# Patient Record
Sex: FEMALE | Race: OTHER
[De-identification: names, ages, dates, MRNs, and addresses within clinical notes are randomized per-mention and may not be internally consistent; named-entity substitution may affect disease eponyms.]

---

## 2021-07-30 ENCOUNTER — HOSPITAL ENCOUNTER (INPATIENT)
Dept: HOSPITAL 15 - ER | Age: 56
LOS: 1 days | Discharge: HOME | DRG: 52 | End: 2021-07-31
Attending: INTERNAL MEDICINE | Admitting: INTERNAL MEDICINE
Payer: MEDICAID

## 2021-07-30 VITALS — HEIGHT: 61 IN | BODY MASS INDEX: 33.92 KG/M2 | WEIGHT: 179.68 LBS

## 2021-07-30 VITALS — DIASTOLIC BLOOD PRESSURE: 75 MMHG | SYSTOLIC BLOOD PRESSURE: 144 MMHG

## 2021-07-30 VITALS — DIASTOLIC BLOOD PRESSURE: 70 MMHG | SYSTOLIC BLOOD PRESSURE: 126 MMHG

## 2021-07-30 DIAGNOSIS — Z91.19: ICD-10-CM

## 2021-07-30 DIAGNOSIS — E66.3: ICD-10-CM

## 2021-07-30 DIAGNOSIS — G43.909: ICD-10-CM

## 2021-07-30 DIAGNOSIS — F32.9: ICD-10-CM

## 2021-07-30 DIAGNOSIS — Z86.73: ICD-10-CM

## 2021-07-30 DIAGNOSIS — E78.5: ICD-10-CM

## 2021-07-30 DIAGNOSIS — Z79.899: ICD-10-CM

## 2021-07-30 DIAGNOSIS — Z20.822: ICD-10-CM

## 2021-07-30 DIAGNOSIS — I16.1: ICD-10-CM

## 2021-07-30 DIAGNOSIS — N39.0: ICD-10-CM

## 2021-07-30 DIAGNOSIS — G93.41: Primary | ICD-10-CM

## 2021-07-30 DIAGNOSIS — Z79.4: ICD-10-CM

## 2021-07-30 DIAGNOSIS — E87.1: ICD-10-CM

## 2021-07-30 DIAGNOSIS — E11.9: ICD-10-CM

## 2021-07-30 LAB
ALBUMIN SERPL-MCNC: 3.8 G/DL (ref 3.4–5)
ALCOHOL, URINE: < 3 MG/DL (ref 0–10)
ALP SERPL-CCNC: 135 U/L (ref 45–117)
ALT SERPL-CCNC: 35 U/L (ref 13–56)
AMPHETAMINES UR QL SCN: NEGATIVE
ANION GAP SERPL CALCULATED.3IONS-SCNC: 5 MMOL/L (ref 5–15)
BARBITURATES UR QL SCN: NEGATIVE
BENZODIAZ UR QL SCN: NEGATIVE
BILIRUB SERPL-MCNC: 0.3 MG/DL (ref 0.2–1)
BUN SERPL-MCNC: 14 MG/DL (ref 7–18)
BUN/CREAT SERPL: 18.4
BZE UR QL SCN: NEGATIVE
CALCIUM SERPL-MCNC: 9 MG/DL (ref 8.5–10.1)
CANNABINOIDS UR QL SCN: NEGATIVE
CHLORIDE SERPL-SCNC: 104 MMOL/L (ref 98–107)
CO2 SERPL-SCNC: 26 MMOL/L (ref 21–32)
GLUCOSE SERPL-MCNC: 129 MG/DL (ref 74–106)
HCT VFR BLD AUTO: 37.4 % (ref 36–46)
HGB BLD-MCNC: 12.8 G/DL (ref 12.2–16.2)
MCH RBC QN AUTO: 29.9 PG (ref 28–32)
MCV RBC AUTO: 87.4 FL (ref 80–100)
NRBC BLD QL AUTO: 0.1 %
OPIATES UR QL SCN: NEGATIVE
PCP UR QL SCN: NEGATIVE
POTASSIUM SERPL-SCNC: 4.1 MMOL/L (ref 3.5–5.1)
PROT SERPL-MCNC: 7.9 G/DL (ref 6.4–8.2)
SODIUM SERPL-SCNC: 135 MMOL/L (ref 136–145)

## 2021-07-30 PROCEDURE — 80307 DRUG TEST PRSMV CHEM ANLYZR: CPT

## 2021-07-30 PROCEDURE — 93005 ELECTROCARDIOGRAM TRACING: CPT

## 2021-07-30 PROCEDURE — 36415 COLL VENOUS BLD VENIPUNCTURE: CPT

## 2021-07-30 PROCEDURE — 70450 CT HEAD/BRAIN W/O DYE: CPT

## 2021-07-30 PROCEDURE — 81001 URINALYSIS AUTO W/SCOPE: CPT

## 2021-07-30 PROCEDURE — 84484 ASSAY OF TROPONIN QUANT: CPT

## 2021-07-30 PROCEDURE — 96365 THER/PROPH/DIAG IV INF INIT: CPT

## 2021-07-30 PROCEDURE — 80053 COMPREHEN METABOLIC PANEL: CPT

## 2021-07-30 PROCEDURE — 87086 URINE CULTURE/COLONY COUNT: CPT

## 2021-07-30 PROCEDURE — 85025 COMPLETE CBC W/AUTO DIFF WBC: CPT

## 2021-07-30 PROCEDURE — 83036 HEMOGLOBIN GLYCOSYLATED A1C: CPT

## 2021-07-30 PROCEDURE — 82962 GLUCOSE BLOOD TEST: CPT

## 2021-07-30 PROCEDURE — 87040 BLOOD CULTURE FOR BACTERIA: CPT

## 2021-07-30 PROCEDURE — 93306 TTE W/DOPPLER COMPLETE: CPT

## 2021-07-30 PROCEDURE — 87426 SARSCOV CORONAVIRUS AG IA: CPT

## 2021-07-30 RX ADMIN — HUMAN INSULIN SCH UNITS: 100 INJECTION, SOLUTION SUBCUTANEOUS at 17:09

## 2021-07-30 RX ADMIN — HYDROCODONE BITARTRATE AND ACETAMINOPHEN PRN TAB: 5; 325 TABLET ORAL at 20:03

## 2021-07-30 RX ADMIN — Medication SCH STRIP: at 17:09

## 2021-07-30 RX ADMIN — SODIUM CHLORIDE SCH MLS/HR: 0.9 INJECTION, SOLUTION INTRAVENOUS at 15:53

## 2021-07-30 RX ADMIN — Medication SCH STRIP: at 21:59

## 2021-07-31 VITALS — DIASTOLIC BLOOD PRESSURE: 67 MMHG | SYSTOLIC BLOOD PRESSURE: 106 MMHG

## 2021-07-31 VITALS — DIASTOLIC BLOOD PRESSURE: 72 MMHG | SYSTOLIC BLOOD PRESSURE: 131 MMHG

## 2021-07-31 RX ADMIN — SODIUM CHLORIDE SCH MLS/HR: 0.9 INJECTION, SOLUTION INTRAVENOUS at 07:25

## 2021-07-31 RX ADMIN — HUMAN INSULIN SCH UNITS: 100 INJECTION, SOLUTION SUBCUTANEOUS at 11:39

## 2021-07-31 RX ADMIN — HYDROCODONE BITARTRATE AND ACETAMINOPHEN PRN TAB: 5; 325 TABLET ORAL at 06:26

## 2021-07-31 RX ADMIN — Medication SCH STRIP: at 06:26

## 2021-07-31 RX ADMIN — HUMAN INSULIN SCH UNITS: 100 INJECTION, SOLUTION SUBCUTANEOUS at 06:28

## 2021-07-31 RX ADMIN — Medication SCH STRIP: at 11:38

## 2023-06-23 ENCOUNTER — HOSPITAL ENCOUNTER (EMERGENCY)
Dept: HOSPITAL 15 - ER | Age: 58
Discharge: HOME | End: 2023-06-23
Payer: MEDICAID

## 2023-06-23 VITALS — BODY MASS INDEX: 29.18 KG/M2 | WEIGHT: 154.54 LBS | HEIGHT: 61 IN

## 2023-06-23 VITALS — SYSTOLIC BLOOD PRESSURE: 143 MMHG | DIASTOLIC BLOOD PRESSURE: 98 MMHG

## 2023-06-23 DIAGNOSIS — I10: Primary | ICD-10-CM

## 2023-06-23 DIAGNOSIS — F41.9: ICD-10-CM

## 2023-06-23 DIAGNOSIS — E11.9: ICD-10-CM

## 2023-06-23 LAB
ALBUMIN SERPL-MCNC: 4 G/DL (ref 3.4–5)
ALP SERPL-CCNC: 123 U/L (ref 45–117)
ALT SERPL-CCNC: 44 U/L (ref 13–56)
ANION GAP SERPL CALCULATED.3IONS-SCNC: 7 MMOL/L (ref 5–15)
BILIRUB SERPL-MCNC: 0.2 MG/DL (ref 0.2–1)
BUN SERPL-MCNC: 12 MG/DL (ref 7–18)
BUN/CREAT SERPL: 15.8 (ref 10–20)
CALCIUM SERPL-MCNC: 9.5 MG/DL (ref 8.5–10.1)
CHLORIDE SERPL-SCNC: 104 MMOL/L (ref 98–107)
CO2 SERPL-SCNC: 27 MMOL/L (ref 21–32)
GLUCOSE SERPL-MCNC: 93 MG/DL (ref 74–106)
HCT VFR BLD AUTO: 39.5 % (ref 36–46)
HGB BLD-MCNC: 13.1 G/DL (ref 12.2–16.2)
MCH RBC QN AUTO: 29.2 PG (ref 28–32)
MCV RBC AUTO: 88 FL (ref 80–100)
NRBC BLD QL AUTO: 0.2 %
POTASSIUM SERPL-SCNC: 4.4 MMOL/L (ref 3.5–5.1)
PROT SERPL-MCNC: 7.6 G/DL (ref 6.4–8.2)
SODIUM SERPL-SCNC: 138 MMOL/L (ref 136–145)

## 2023-06-23 PROCEDURE — 71045 X-RAY EXAM CHEST 1 VIEW: CPT

## 2023-06-23 PROCEDURE — 36415 COLL VENOUS BLD VENIPUNCTURE: CPT

## 2023-06-23 PROCEDURE — 84484 ASSAY OF TROPONIN QUANT: CPT

## 2023-06-23 PROCEDURE — 85025 COMPLETE CBC W/AUTO DIFF WBC: CPT

## 2023-06-23 PROCEDURE — 80053 COMPREHEN METABOLIC PANEL: CPT

## 2023-06-23 PROCEDURE — 99285 EMERGENCY DEPT VISIT HI MDM: CPT

## 2023-06-23 PROCEDURE — 96372 THER/PROPH/DIAG INJ SC/IM: CPT

## 2023-06-23 PROCEDURE — 93005 ELECTROCARDIOGRAM TRACING: CPT

## 2023-08-20 ENCOUNTER — HOSPITAL ENCOUNTER (EMERGENCY)
Dept: HOSPITAL 15 - ER | Age: 58
Discharge: HOME | End: 2023-08-20
Payer: MEDICAID

## 2023-08-20 VITALS — HEART RATE: 70 BPM | RESPIRATION RATE: 18 BRPM | OXYGEN SATURATION: 98 %

## 2023-08-20 VITALS — BODY MASS INDEX: 30.34 KG/M2 | HEIGHT: 60 IN | WEIGHT: 154.54 LBS

## 2023-08-20 VITALS — TEMPERATURE: 97.5 F | SYSTOLIC BLOOD PRESSURE: 109 MMHG | DIASTOLIC BLOOD PRESSURE: 65 MMHG

## 2023-08-20 DIAGNOSIS — N39.0: ICD-10-CM

## 2023-08-20 DIAGNOSIS — R51.9: ICD-10-CM

## 2023-08-20 DIAGNOSIS — E78.5: ICD-10-CM

## 2023-08-20 DIAGNOSIS — I10: ICD-10-CM

## 2023-08-20 DIAGNOSIS — Z32.02: ICD-10-CM

## 2023-08-20 DIAGNOSIS — E10.9: ICD-10-CM

## 2023-08-20 DIAGNOSIS — I16.0: Primary | ICD-10-CM

## 2023-08-20 LAB
ALBUMIN SERPL-MCNC: 4.1 G/DL (ref 3.4–5)
ALP SERPL-CCNC: 128 U/L (ref 45–117)
ALT SERPL-CCNC: 28 U/L (ref 13–56)
ANION GAP SERPL CALCULATED.3IONS-SCNC: 9 MMOL/L (ref 5–15)
BILIRUB SERPL-MCNC: 0.2 MG/DL (ref 0.2–1)
BUN SERPL-MCNC: 16 MG/DL (ref 7–18)
BUN/CREAT SERPL: 19.3 (ref 10–20)
CALCIUM SERPL-MCNC: 9.1 MG/DL (ref 8.5–10.1)
CHLORIDE SERPL-SCNC: 104 MMOL/L (ref 98–107)
CO2 SERPL-SCNC: 24 MMOL/L (ref 21–32)
GLUCOSE SERPL-MCNC: 178 MG/DL (ref 74–106)
HCT VFR BLD AUTO: 37.9 % (ref 36–46)
HGB BLD-MCNC: 12.9 G/DL (ref 12.2–16.2)
MCH RBC QN AUTO: 29.3 PG (ref 28–32)
MCV RBC AUTO: 86.4 FL (ref 80–100)
NRBC BLD QL AUTO: 0.1 %
POTASSIUM SERPL-SCNC: 4 MMOL/L (ref 3.5–5.1)
PROT SERPL-MCNC: 8.1 G/DL (ref 6.4–8.2)
PROT UR STRIP.AUTO-MCNC: (no result) MG/DL
SODIUM SERPL-SCNC: 137 MMOL/L (ref 136–145)

## 2023-08-20 PROCEDURE — 80053 COMPREHEN METABOLIC PANEL: CPT

## 2023-08-20 PROCEDURE — 36415 COLL VENOUS BLD VENIPUNCTURE: CPT

## 2023-08-20 PROCEDURE — 93005 ELECTROCARDIOGRAM TRACING: CPT

## 2023-08-20 PROCEDURE — 84484 ASSAY OF TROPONIN QUANT: CPT

## 2023-08-20 PROCEDURE — 85379 FIBRIN DEGRADATION QUANT: CPT

## 2023-08-20 PROCEDURE — 99285 EMERGENCY DEPT VISIT HI MDM: CPT

## 2023-08-20 PROCEDURE — 83880 ASSAY OF NATRIURETIC PEPTIDE: CPT

## 2023-08-20 PROCEDURE — 96365 THER/PROPH/DIAG IV INF INIT: CPT

## 2023-08-20 PROCEDURE — 81001 URINALYSIS AUTO W/SCOPE: CPT

## 2023-08-20 PROCEDURE — 85025 COMPLETE CBC W/AUTO DIFF WBC: CPT

## 2023-08-20 PROCEDURE — 71045 X-RAY EXAM CHEST 1 VIEW: CPT

## 2025-03-04 ENCOUNTER — HOSPITAL ENCOUNTER (INPATIENT)
Dept: HOSPITAL 15 - ER | Age: 60
LOS: 1 days | Discharge: HOME | DRG: 199 | End: 2025-03-05
Payer: MEDICAID

## 2025-03-04 VITALS — OXYGEN SATURATION: 95 % | RESPIRATION RATE: 18 BRPM | HEART RATE: 65 BPM

## 2025-03-04 VITALS — HEIGHT: 61 IN | WEIGHT: 150.36 LBS | BODY MASS INDEX: 28.39 KG/M2

## 2025-03-04 DIAGNOSIS — K21.9: ICD-10-CM

## 2025-03-04 DIAGNOSIS — I10: ICD-10-CM

## 2025-03-04 DIAGNOSIS — N20.0: ICD-10-CM

## 2025-03-04 DIAGNOSIS — Z86.73: ICD-10-CM

## 2025-03-04 DIAGNOSIS — I16.0: Primary | ICD-10-CM

## 2025-03-04 DIAGNOSIS — Z79.4: ICD-10-CM

## 2025-03-04 DIAGNOSIS — Z79.899: ICD-10-CM

## 2025-03-04 DIAGNOSIS — Z79.84: ICD-10-CM

## 2025-03-04 DIAGNOSIS — I16.1: ICD-10-CM

## 2025-03-04 DIAGNOSIS — E11.9: ICD-10-CM

## 2025-03-04 DIAGNOSIS — E78.5: ICD-10-CM

## 2025-03-04 DIAGNOSIS — Z98.891: ICD-10-CM

## 2025-03-04 DIAGNOSIS — Z79.82: ICD-10-CM

## 2025-03-04 DIAGNOSIS — E66.9: ICD-10-CM

## 2025-03-04 DIAGNOSIS — Z82.3: ICD-10-CM

## 2025-03-04 LAB
ANION GAP SERPL CALCULATED.3IONS-SCNC: 10 MMOL/L (ref 5–15)
BUN SERPL-MCNC: 11 MG/DL (ref 9–23)
BUN/CREAT SERPL: 13.4 (ref 10–20)
CALCIUM SERPL-MCNC: 10.7 MG/DL (ref 8.7–10.4)
CHLORIDE SERPL-SCNC: 101 MMOL/L (ref 98–107)
CHOLEST SERPL-MCNC: 151 MG/DL (ref ?–200)
CO2 SERPL-SCNC: 27 MMOL/L (ref 20–31)
GLUCOSE SERPL-MCNC: 155 MG/DL (ref 74–106)
HCT VFR BLD AUTO: 39.5 % (ref 36–46)
HDLC SERPL-MCNC: 58 MG/DL (ref 40–59)
HGB BLD-MCNC: 13.4 G/DL (ref 12.2–16.2)
MAGNESIUM SERPL-MCNC: 1.7 MG/DL (ref 1.6–2.6)
MCH RBC QN AUTO: 29.6 PG (ref 28–32)
MCV RBC AUTO: 87 FL (ref 80–100)
NRBC BLD QL AUTO: 0.1 %
POTASSIUM SERPL-SCNC: 4.3 MMOL/L (ref 3.5–5.1)
SODIUM SERPL-SCNC: 138 MMOL/L (ref 136–145)
TRIGL SERPL-MCNC: 192 MG/DL (ref ?–150)

## 2025-03-04 PROCEDURE — 87340 HEPATITIS B SURFACE AG IA: CPT

## 2025-03-04 PROCEDURE — 84443 ASSAY THYROID STIM HORMONE: CPT

## 2025-03-04 PROCEDURE — 80048 BASIC METABOLIC PNL TOTAL CA: CPT

## 2025-03-04 PROCEDURE — 82962 GLUCOSE BLOOD TEST: CPT

## 2025-03-04 PROCEDURE — 70450 CT HEAD/BRAIN W/O DYE: CPT

## 2025-03-04 PROCEDURE — 96372 THER/PROPH/DIAG INJ SC/IM: CPT

## 2025-03-04 PROCEDURE — 80053 COMPREHEN METABOLIC PANEL: CPT

## 2025-03-04 PROCEDURE — 71045 X-RAY EXAM CHEST 1 VIEW: CPT

## 2025-03-04 PROCEDURE — 84484 ASSAY OF TROPONIN QUANT: CPT

## 2025-03-04 PROCEDURE — 80061 LIPID PANEL: CPT

## 2025-03-04 PROCEDURE — 81001 URINALYSIS AUTO W/SCOPE: CPT

## 2025-03-04 PROCEDURE — 36415 COLL VENOUS BLD VENIPUNCTURE: CPT

## 2025-03-04 PROCEDURE — 83036 HEMOGLOBIN GLYCOSYLATED A1C: CPT

## 2025-03-04 PROCEDURE — 85025 COMPLETE CBC W/AUTO DIFF WBC: CPT

## 2025-03-04 PROCEDURE — 93005 ELECTROCARDIOGRAM TRACING: CPT

## 2025-03-04 PROCEDURE — 83880 ASSAY OF NATRIURETIC PEPTIDE: CPT

## 2025-03-04 PROCEDURE — 93976 VASCULAR STUDY: CPT

## 2025-03-04 PROCEDURE — 86803 HEPATITIS C AB TEST: CPT

## 2025-03-04 PROCEDURE — 93306 TTE W/DOPPLER COMPLETE: CPT

## 2025-03-04 PROCEDURE — 99291 CRITICAL CARE FIRST HOUR: CPT

## 2025-03-04 PROCEDURE — 83735 ASSAY OF MAGNESIUM: CPT

## 2025-03-04 RX ADMIN — HUMAN INSULIN SCH UNITS: 100 INJECTION, SOLUTION SUBCUTANEOUS at 23:55

## 2025-03-04 RX ADMIN — Medication SCH STRIP: at 17:25

## 2025-03-04 RX ADMIN — SODIUM CHLORIDE SCH ML: 9 INJECTION INTRAMUSCULAR; INTRAVENOUS; SUBCUTANEOUS at 14:10

## 2025-03-04 RX ADMIN — HUMAN INSULIN SCH UNITS: 100 INJECTION, SOLUTION SUBCUTANEOUS at 17:34

## 2025-03-04 NOTE — DVHINCON2
Date Seen:  Mar 4, 2025


Referring Physician


YAEL Petersen





Reason for Consultation


Hypertensive urgency





History of Present Illness


This is a pleasant Albanian-speaking mostly 59-year-old female who presented to 

the emergency room via EMS with a chief complaint of uncontrolled blood 

pressure.  The patient reports she was feeling some generalized weakness 

associated with dizziness, a mild HA, and mild SOB.  She found a systolic blood 

pressure up to the 220s mmHg prompting her to call 911.  States she is compliant

with her antihypertensive therapy at home including losartan potassium/HCTZ 100 

mg/25 mg q.d. and atenolol 25 mg HS.  These medications were prescribed by her 

PCP in [place] approximately 8 years ago.  She endorses recent stress factors 

such as her son involved  in a recent car accident and mother-in-law recently 

.  Denies chest pain, palpitations, diaphoresis, or syncopal events.  

She underwent a 12 lead electrocardiogram revealing a sinus rhythm with 

nonspecific changes to lateral leads.  Baseline troponin level is negative.  

Significant medical history includes hypertension, dyslipidemia, and insulin-

dependent diabetes mellitus.





Past Medical History


Past medical history reviewed.  No other significant than mentioned above.





Past Surgical History


C-sections x2





Family History:  


Cerebrovascular accident (CVA)


  G8 MOTHER


Family History


Family history reviewed.


Social History


Denies the use of illicit drugs, alcohol, or tobacco use.





Allergies:  


Coded Allergies:  


     NO KNOWN ALLERGIES (Unverified , 16)


Home Meds


Reported Medications


Losartan Potassium & Hydrochlo (Losartan Potassium/Hydroc) 1 Tab Tab, 1 TAB PO 

DAILY


   3/4/25


Aspirin (Aspir-Low) 81 Mg Tab, 81 MG PO DAILY


   21


Cyanocobalamin (Vitamin B-12) Unknown Strength Tab, PO DAILY


   PATIENT DOES NOT RECALL DOSE


   21


Ascorbic Acid (VITAMIN C TABLET) Unknown Strength Tb, PO DAILY


   PATIENT DOES NOT RECALL DOSE


   21


Curcuma Longa (Turmeric) Extra (TURMERIC) Unknown Strength Cap, PO DAILY, CAP


   PATIENT DOES NOT RECALL DOSE


   21


Multiple Vitamin (Mvi Tab) 1 Tab Tb, 1 TAB PO DAILY


   21


Insulin Lispro (Human) (Humalog) 100 Unit/Ml Inj, 7 UNIT SC TID


   21


Omeprazole (Omeprazole Dr) 20 Mg Cap, 1 CAP PO DAILYPRN


   21


Atorvastatin Calcium (ATORVASTATIN CALCIUM) 20 Mg Tab, 1 TAB PO DAILY


   21


Atenolol (Atenolol) 25 Mg Tab, 1 TAB PO DAILY


   21


Metformin Hydrochloride (Metformin Hcl) 1,000 Mg Tab, 1 TAB PO BID


   21


Insulin Glargine (Lantus) 100 Unit/Ml Inj, 25 UNIT SC AT BEDTIME


   21


Discontinued Reported Medications


Hydrochlorothiazide (Hydrochlorothiazide) 12.5 Mg Cap, 1 CAP PO QAM


   21


Losartan Potassium (Losartan Potassium) 50 Mg Tab, 1 TAB PO DAILY


   21


Discontinued Scripts


Sulfamethoxazole W/Trimethopri (Bactrim Ds Tablet) 1 Tab Tb, 1 TAB PO BID for 10

Days, #20 TAB


   Prov:ENRIQUE WALLACE MD         23


Home Meds


Home medications reviewed.


Current Medications





                               Current Medications








 Medications


  (Trade)  Dose


 Ordered  Sig/Gertrude


 Route


 PRN Reason  Start Time


 Stop Time Status Last Admin


 


 Sodium Chloride


  (Saline Lock Ns)  10 ml  Q8HR


 IV


   3/4/25 14:00


    3/4/25 14:10





 


 Ondansetron HCl


  (Zofran)  4 mg  Q4HP  PRN


 IV


 NAUSEA / VOMITING  3/4/25 13:15


     





 


 Docusate Sodium


  (Colace Capsule)  100 mg  BIDPRN  PRN


 PO


 FOR CONSTIPATION  3/4/25 13:15


     





 


 Nitroglycerin


  (Ntrostat


 Sublingual)  0.4 mg  Q5MINP  PRN


 SL


 FOR CHEST PAIN  3/4/25 13:15


     





 


 Morphine Sulfate  2 mg  Q30M  PRN


 IV


 FOR CHEST PAIN  3/4/25 13:15


     





 


 Aspirin


  (Ecotrin Enteric


 Coated Tablet)  81 mg  DAILY


 PO


   3/5/25 10:00


     





 


 Atenolol


  (Tenormin Tablet)  25 mg  DAILY


 PO


   3/5/25 10:00


     





 


 Atorvastatin


 Calcium


  (Lipitor)  20 mg  DAILY


 PO


   3/5/25 10:00


     





 


 Insulin Glargine


  (Lantus)  25 units  HS


 SC


   3/4/25 22:00


     





 


 Pantoprazole


 Sodium


  (Protonix Tablet)  20 mg  DAILY


 PO


   3/5/25 10:00


     





 


 Patient Own


 Medication  1 tab  DAILY


 PO


   3/5/25 10:00


     





 


 Hydralazine HCl


  (Apresoline


 Injection)  10 mg  Q6HP  PRN


 IV


 SBP>150  3/4/25 13:30


     





 


 Diagnostic Test


  (Pha)


  (Accu-Chek


 Comfort Curve T)  1 strip  ACHS


 VI


   3/4/25 17:00


     





 


 Insulin Human


 Regular


  (InsuLIN R)    HS


 SC


   3/4/25 22:00


     





 


 Insulin Human


 Regular


  (InsuLIN R)    AC


 SC


   3/4/25 17:00


     





 


 Dextrose  50 ml  UD  PRN


 IV


 Blood Sugar LESS THAN 60  3/4/25 13:30


     














Review of Systems


Constitutional:  Generalized weakness


Ears, Nose, & Throat:  No symptom reported


Eyes:  No symptom reported


Neurological:  Mucinous, mild HA


Pulmonary/Respiratory:  SOB


Cardiovascular: No symptom reported


Gastrointestinal:  No symptom reported


Genitourinary:  No symptom reported


Musculoskeletal:  No symptom reported


Skin:  No symptom reported


Psychiatric:  No symptom reported


Endocrine:  No symptom reported


Hemotologic/Lymphatic:  No symptom reported





Vital Signs





                                   Vital Signs








  Date Time  Temp Pulse Resp B/P (MAP) Pulse Ox O2 Delivery O2 Flow Rate FiO2


 


3/4/25 12:03 97.9 68 18 145/81 (102) 95   





 97.9       


 


3/4/25 09:41      Room Air* 0 21








Physical Exam


General Appearance:  Cooperative. Well developed. Well nourished.  In no acute 

distress


Head Exam:  Normal inspection


Neck Exam:  Normal inspection. Non-tender. Normal alignment


Pulmonary/Respiratory:  Chest non-tender. Clear bilateral breath sounds


Cardiovascular/Chest: Regular rate and rhythm.  S1, S2.  Sinus rhythm with 

nonspecific lateral changes.  No murmurs.  No JVD.


Peripheral Pulses:  2+ Radial (R). 2+ Radial (L). 2+ Pedal (R). 2+ Pedal (L)


Abdominal Exam:  Normal bowel sounds. Soft. Nontender.  No hepatospenomegaly.  

No masses


Ankle Exam: Negative ankle edema


Lower extremities: Negative lower extremity edema


Neuro/Mental Status: A&O x4.  Coherent


Thoughts/Psych:  Normal thought pattern.  Appropriate mood and affect.  Good 

judgement and insight


Appearance: In no acute distress


Skin Exam:  Normal inspection. Normal color. Warm. Dry





Labs/Diagnostic Data





                                      Labs








Test


 3/4/25


10:29 3/4/25


09:53 Range/Units


 


 


Sodium Level 138   136-145  mmol/L


 


Potassium Level 4.3   3.5-5.1  mmol/L


 


Chloride Level 101     mmol/L


 


Carbon Dioxide Level 27   20-31  mmol/L


 


Anion Gap 10   5-15  


 


Blood Urea Nitrogen 11   9-23  mg/dL


 


Creatinine


 0.82 


 


 0.550-1.02


mg/dL


 


Glomerular Filtration Rate


Calc 83 


 


 >90  mL/min





 


BUN/Creatinine Ratio 13.4   10.0-20.0  


 


Serum Glucose 155 H    mg/dL


 


Calcium Level 10.7 H  8.7-10.4  mg/dL


 


Troponin I High Sensitivity 4   </=34  ng/L


 


White Blood Count


 


 6.7 


 4.4-10.8


10^3/uL


 


Red Blood Count


 


 4.54 


 4.0-5.20


10^6/uL


 


Hemoglobin  13.4  12.2-16.2  g/dL


 


Hematocrit  39.5  36.0-46.0  %


 


Mean Corpuscular Volume  87.0  80.0-100.0  fL


 


Mean Corpuscular Hemoglobin  29.6  28.0-32.0  pg


 


Mean Corpuscular Hemoglobin


Concent 


 34.0 


 32.0-36.0  g/dL





 


Red Cell Distribution Width  13.9  11.8-14.3  %


 


Platelet Count


 


 387 


 140-450


10^3/uL


 


Mean Platelet Volume  7.4  6.9-10.8  fL


 


Neutrophils (%) (Auto)  55.5  37.0-80.0  %


 


Lymphocytes (%) (Auto)  35.3  10.0-50.0  %


 


Monocytes (%) (Auto)  6.3  0.0-12.0  %


 


Eosinophils (%) (Auto)  2.1  0.0-7.0  %


 


Basophils (%) (Auto)  0.8  0.0-2.0  %


 


Neutrophils # (Auto)


 


 3.7 


 1.6-8.6  10


^3/uL


 


Lymphocytes # (Auto)


 


 2.4 


 0.4-5.4  10


^3/uL


 


Monocytes # (Auto)  0.4  0-1.3  10 ^3/uL


 


Eosinophils # (Auto)  0.1  0-0.8  10 ^3/uL


 


Basophils # (Auto)  0.1  0-0.2  10 ^3/uL


 


Nucleated Red Blood Cells  0.1   %


 


Urine Color  Light-yellow  Yellow  


 


Urine Clarity  Clear  Clear  


 


Urine pH  6.0  5.0-9.0  


 


Urine Specific Gravity  1.017  1.001-1.035  


 


Urine Protein  Negative  Negative  


 


Urine Ketones  Negative  Negative  


 


Urine Blood  Negative  Negative  /uL


 


Urine Nitrite  Negative  Negative  


 


Urine Bilirubin  Negative  Negative  


 


Urine Urobilinogen  Normal  Negative  mg/dL


 


Urine Leukocyte Esterase  2+  Negative  /uL


 


Urine RBC  1  0 - 4  /hpf


 


Urine Microscopic WBC  6 H 0-5  /HPF


 


Urine Squamous Epithelial


Cells 


 Few 


 <5  /hpf





 


Urine Bacteria  None seen  None Seen  /hpf


 


Urine Glucose  Normal  Normal  mg/dL











Assessment


Hypertensive urgency


Dyslipidemia 


Insulin-dependent diabetes mellitus 


Obesity


Plan/Recommendation


(Dr. Cullen)





We will continue further cardiac evaluation with a transthoracic echocardiogram 

to rule out structural heart disease.  In the meantime, continue aggressive 

blood pressure control for a target SBP <140 mmHg.  Resume losartan therapy and 

HCTZ and uptitrate as tolerated.  Obtain a CXR and renal artery duplex to rule 

out stenosis.  BNP, Mg, lipid panel, TSH and HgbA1C levels pending at this time.

 Monitor ECG changes and notify.  Thank you for allowing us to participate in 

this patient's care.  Please call if you have any questions or concerns.





This medical document was created using an electronic medical record system with

voice recognition software and computerized dictation system.  Although this 

document has been carefully reviewed, there might still be some phonetic and 

typographical errors.  Occasional wrong-word or ``sound-alike substitutions 

may have occurred due to the inherent limitations of voice recognition software.

 These areas are purely typographical due to imperfections of the software 

programs and do not reflect any compromise in the patient's medical care.  

Please read the chart carefully and recognize, using context, where these 

substitutions have occurred.


Plan discussed with:  Patient, Daughter, Other


NYHA








Physical activity limitations: NA











Date of Service:  Mar 4, 2025


Billing Provider:  LORI ALCALA


Cardiology Common Codes:  99223-INITIAL  INP/OBS CARE (High)











LORI ALCALA            Mar 4, 2025 16:33

## 2025-03-04 NOTE — DVH
INDICATION: Hypertensive urgency



TECHNIQUE: Multiple real-time sonographic images of the kidneys and bladder were obtained.



Duplex Doppler evaluation including color Doppler and spectral/pulsed waveform analysis of the bilate
ral renal arteries was performed.



COMPARISON: None



FINDINGS: 



The right kidney measures 10. cm in length. The right renal echogenicity, contour and cortical thickn
ess are within normal limits. No hydronephrosis .  Right midpole renal calculi measures up to 0.6 cm



The left kidney measures 10.1 cm in length. The left renal echogenicity, contour, and cortical thickn
ess are within normal limits. Mild hydronephrosis .  No large masses/calculi are seen.



Incidentally seen is cholelithiasis without evidence of cholecystitis.



Aorta peak systolic velocity,  106 cm/s



Right renal artery peak systolic velocity,  68 a cm/s (< 180 cm/s = normal).



Left renal artery peak systolic velocity  78 cm/s (< 180 cm/s = normal).



Right RAR :  0.6 (< 3.5, normal)



Left RAR  0.8 (< 3.5, normal)



Right RI:  0.73 (< 0.75, normal)



Left RI:  0.80 (< 0.75, normal)



IMPRESSION: 



1. Slightly elevated left-sided restrictive index which may reflect mild left-sided renal artery sten
osis.



2. No right-sided renal artery stenosis.



3. Right-sided nephrolithiasis without hydronephrosis.



4. Mild left hydronephrosis.



5. Cholelithiasis without evidence of cholecystitis



*LAXMI [employer] Techniques in Noninvasive Vascular Diagnosis 2002



Electronically Signed by: Sang Lutz at 03/04/2025 23:45:05 PM

## 2025-03-04 NOTE — ECG
[facility]

                                       

Test Date:    2025               Test Time:    10:03:16

Pat Name:     AYLIN SALAS            Department:   EMS

Patient ID:   [id number]           Room:         [address] [place] [address]

Gender:       F                        Technician:   JAKE

:          1965               Requested By: TERRY DUVAL

Order Number: [id number]        Reading MD:   Grant Cullen

                                 Measurements

Intervals                              Axis          

Rate:         63                       P:            6

NC:           141                      QRS:          35

QRSD:         103                      T:            91

QT:           407                                    

QTc:          417                                    

                           Interpretive Statements

Sinus rhythm

Nonspecific T abnormalities, lateral leads

Electronically Signed On 3-8-2025 17:41:39 PST by Grant Cullen



Please click the below link to view image of tracing.

## 2025-03-04 NOTE — ED.PDOC
HPI Comments


59 year old female BIBA presents to the ED with chief complaint of tingling and 

high blood pressure. EMS reports that the patient has been experiencing body 

tingling with associated body pain and nausea since this morning at home. EMS 

relays that the patient's blood pressure on scene was noted to be 250/91. EMS 

states they provided the patient Zofran while on route. Patient denies any chest

pain, headache, dizziness, vomiting, or diarrhea.


Chief Complaint:  High Blood Pressure


Time Seen by MD:  10:05


Primary Care Provider:  RASHEEDAOA


Reviewed Notes:  Nurses Notes, Paramedic Notes, Medications, Allergies


Allergies:  


Coded Allergies:  


     NO KNOWN ALLERGIES (Unverified , 16)


Home Meds


Active Scripts


Sulfamethoxazole W/Trimethopri (Bactrim Ds Tablet) 1 Tab Tb, 1 TAB PO BID for 10

Days, #20 TAB


   Prov:ENRIQUE WALLACE MD         23


Reported Medications


Aspirin (Aspir-Low) 81 Mg Tab, 81 MG PO DAILY


   21


Cyanocobalamin (Vitamin B-12) Unknown Strength Tab, PO DAILY


   PATIENT DOES NOT RECALL DOSE


   21


Ascorbic Acid (VITAMIN C TABLET) Unknown Strength Tb, PO DAILY


   PATIENT DOES NOT RECALL DOSE


   21


Curcuma Longa (Turmeric) Extra (TURMERIC) Unknown Strength Cap, PO DAILY, CAP


   PATIENT DOES NOT RECALL DOSE


   21


Multiple Vitamin (Mvi Tab) 1 Tab Tb, 1 TAB PO DAILY


   21


Insulin Lispro (Human) (Humalog) 100 Unit/Ml Inj, 7 UNIT SC TID


   21


Omeprazole (Omeprazole Dr) 20 Mg Cap, 1 CAP PO DAILYPRN


   21


Atorvastatin Calcium (ATORVASTATIN CALCIUM) 20 Mg Tab, 1 TAB PO DAILY


   21


Hydrochlorothiazide (Hydrochlorothiazide) 12.5 Mg Cap, 1 CAP PO QAM


   21


Losartan Potassium (Losartan Potassium) 50 Mg Tab, 1 TAB PO DAILY


   21


Atenolol (Atenolol) 25 Mg Tab, 1 TAB PO DAILY


   21


Metformin Hydrochloride (Metformin Hcl) 1,000 Mg Tab, 1 TAB PO BID


   21


Insulin Glargine (Lantus) 100 Unit/Ml Inj, 25 UNIT SC AT BEDTIME


   21


Information Source:  Patient, Emergency Med Personnel


Mode of Arrival:  EMS


Severity:  Moderate


Timing:  Hours


Duration:  Since onset


Prehospital treatment:  None


Onset:  At Rest


Cardiac Risk Factors:  Hyperlipidemia, HTN, Diabetes


PE Risk Factors:  None





Past Medical History


PAST MEDICAL HISTORY:  DM, High Lipids, HTN


Surgical History:  


GYN History:  No Pertinent GYN History





Family History


Family History:  Unobtainable





Social History


Smoker:  Non-Smoker


Alcohol:  Denies ETOH Use


Drugs:  Denies Drug Use


Lives In:  Home





Constitutional:  reports: others (body aches); denies: chills, diaphoresis, 

fatigue, fever, malaise, sweats, weakness


EENTM:  denies: blurred vision, double vision, ear bleeding, ear discharge, ear 

drainage, ear pain, ear ringing, eye pain, eye redness, hearing loss, mouth 

pain, mouth swelling, nasal discharge, nose bleeding, nose congestion, nose 

pain, photophobia, tearing, throat pain, throat swelling, voice changes, others


Respiratory:  denies: cough, hemoptysis, orthopnea, SOB at rest, shortness of 

breath, SOB with excertion, stridor, wheezing, others


Cardiovascular:  denies: chest pain, dizzy spells, diaphoresis, Dyspnea on 

exertion, edema, irregular heart beat, left arm pain, lightheadedness, 

palpitations, PND, syncope, others


Gastrointestinal:  reports: nausea; denies: abdomen distended, abdominal pain, 

blood streaked bowels, constipated, diarrhea, dysphagia, difficulty swallowing, 

hematemesis, melena, poor appetite, poor fluid intake, rectal bleeding, rectal 

pain, vomiting, others


Genitourinary:  denies: abnormal vagina bleeding, burning, dyspareunia, dysuria,

flank pain, frequency, hematuria, incontinence, pain, pregnant, vagina 

discharge, urgency, others


Neurological:  reports: tingling; denies: dizziness, fainting, headache, left 

sided numbness, left sided weakness, numbness, paresthesia, pre-existing 

deficit, right sided numbness, right sided weakness, seizure, speech problems, 

tremors, weakness, others


Musculoskeletal:  denies: back pain, gout, joint pain, joint swelling, muscle 

pain, muscle stiffness, neck pain, others


Integumetry:  denies: bruises, change in color, change in hair/nails, dryness, 

laceration, lesions, lumps, rash, wounds, others


Allergic/Immunocompromised:  denies: Difficulty Healing, Frequent Infections, 

Hives, Itching, others


Hematologic/Lymphatic:  denies: anemia, blood clots, easy bleeding, easy 

bruising, swollen glands, others


Endocrine:  denies: excessive hunger, excessive sweating, excessive thirst, 

excessive urination, flushing, intolerance to cold, intolerance to heat, 

unexplained weight gain, unexplained weight loss, others


Psychiatric:  denies: anxiety, bipolar disorder, depression, hopeless, panic 

disorder, schizophrenia, sleepless, suicidal, others


All Other Systems:  Reviewed and Negative





Physical Exam


General Appearance:  Moderate Distress, Normal


HEENT:  Normal ENT Inspection, PERRL/EOMI


Neck:  Full Range of Motion, Non-Tender, Normal, Normal Inspection


Respiratory:  Chest Non-Tender, Lungs Clear, No Accessory Muscle Use, No 

Respiratory Distress, Normal Breath Sounds


Cardiovascular:  No Edema, No JVD, No Murmur, No Gallop, Normal Peripheral 

Pulses, Regular Rate/Rhythm


Breast Exam:  Deferred


Gastrointestinal:  No Organomegaly, Non Tender, No Pulsatile Mass, Normal Bowel 

Sounds, Soft


Genitalia:  Deferred


Pelvic:  Deferred


Rectal:  Deferred


Extremities:  No calf tenderness, Normal capillary refill, Normal inspection, 

Normal range of motion, Non-tender, No pedal edema


Musculoskeletal :  


   Apperance:  Normal


Neurologic:  Alert, CNs II-XII nml as Tested, No Motor Deficits, Normal Affect, 

Normal Mood, No Sensory Deficits


Cerebellar Function:  NOT DONE


Reflexes:  NOT DONE


Skin:  Dry, Normal Color, Warm


Peripheral Pulses:  3+ Radial (R), 3+ Radial (L)


Lymphatic:  No Adenopathy





Was a procedure done?


Was a procedure done?:  No





CP Differential Dx


Differential Diagnosis:  A-fib, A-Flutter, Angina, Anxiety / Panic Attack, 

Atrial Dysrhythmia, Electrolyte Disorder





X-Ray, Labs, Meds, VS





                                   Vital Signs








  Date Time  Temp Pulse Resp B/P (MAP) Pulse Ox O2 Delivery O2 Flow Rate FiO2


 


3/4/25 10:03  63      


 


3/4/25 10:03    179/95    


 


3/4/25 09:41      Room Air* 0 21


 


3/4/25 09:41 98.2 71 16 205/91 (129) 96   


 


3/4/25 09:41 98.2 71 16 205/91 (129) 96   





 98.2       








                                       Lab








Test


 3/4/25


10:29 3/4/25


09:53 Range/Units


 


 


Sodium Level 138   136-145  mmol/L


 


Potassium Level 4.3   3.5-5.1  mmol/L


 


Chloride Level 101     mmol/L


 


Carbon Dioxide Level 27   20-31  mmol/L


 


Anion Gap 10   5-15  


 


Blood Urea Nitrogen 11   9-23  mg/dL


 


Creatinine


 0.82 


 


 0.550-1.02


mg/dL


 


Glomerular Filtration Rate


Calc 83 


 


 >90  mL/min





 


BUN/Creatinine Ratio 13.4   10.0-20.0  


 


Serum Glucose 155 H    mg/dL


 


Calcium Level 10.7 H  8.7-10.4  mg/dL


 


Troponin I High Sensitivity 4   </=34  ng/L


 


White Blood Count


 


 6.7 


 4.4-10.8


10^3/uL


 


Red Blood Count


 


 4.54 


 4.0-5.20


10^6/uL


 


Hemoglobin  13.4  12.2-16.2  g/dL


 


Hematocrit  39.5  36.0-46.0  %


 


Mean Corpuscular Volume  87.0  80.0-100.0  fL


 


Mean Corpuscular Hemoglobin  29.6  28.0-32.0  pg


 


Mean Corpuscular Hemoglobin


Concent 


 34.0 


 32.0-36.0  g/dL





 


Red Cell Distribution Width  13.9  11.8-14.3  %


 


Platelet Count


 


 387 


 140-450


10^3/uL


 


Mean Platelet Volume  7.4  6.9-10.8  fL


 


Neutrophils (%) (Auto)  55.5  37.0-80.0  %


 


Lymphocytes (%) (Auto)  35.3  10.0-50.0  %


 


Monocytes (%) (Auto)  6.3  0.0-12.0  %


 


Eosinophils (%) (Auto)  2.1  0.0-7.0  %


 


Basophils (%) (Auto)  0.8  0.0-2.0  %


 


Neutrophils # (Auto)


 


 3.7 


 1.6-8.6  10


^3/uL


 


Lymphocytes # (Auto)


 


 2.4 


 0.4-5.4  10


^3/uL


 


Monocytes # (Auto)  0.4  0-1.3  10 ^3/uL


 


Eosinophils # (Auto)  0.1  0-0.8  10 ^3/uL


 


Basophils # (Auto)  0.1  0-0.2  10 ^3/uL


 


Nucleated Red Blood Cells  0.1   %








                               Current Medications








 Medications


  (Trade)  Dose


 Ordered  Sig/Gertrude


 Route  Start Time


 Stop Time Status Last Admin


 


 Amlodipine


 Besylate


  (Norvasc Tablet)  10 mg  ONCE  ONCE


 PO  3/4/25 10:00


 3/4/25 10:01 DC 3/4/25 10:03








Patient alert pain 


Blood pressure elevated.  


Was given amlodipine.  


Has good strength in all extremities.


Possible TIA.  


EKG reviewed does not show any acute changes.  


Reviewed her history.  


Explained to the patient.  


Continue monitoring.





CT Head: FINDINGS: 





There is no evidence of acute intracranial hemorrhage, extra-axial collection, 

mass effect, midline shift, herniation or hydrocephalus. 





The ventricles, sulci and cisterns are age appropriate.





The gray-white differentiation is intact. 





The visualized paranasal sinuses and mastoid air cells are clear. 





No depressed calvarial fracture. The surrounding soft tissues are unremarkable.





IMPRESSION: 





1. No evidence of acute intracranial abnormality.


Images Reviewed?:  Images reviewed and evaluated by me


Time of 1ST Reevaluation:  11:05


Reevaluation 1ST:  Unchanged


Patient Education/Counseling:  Diagnosis, Treatment


Family Education/Counseling:  No Family Present


Additional Information


Previous visit documents reviewed: 23 for hypertensive urgency





The following tests were ordered, and results were reviewed by me: CBC, BMP, UA,

Troponin, EKG, CT Head, 





Additional Information was gathered from interviewing the following independent 

historians: 





I reviewed and agreed with the following test results read by other providers:





I discussed treatment and results with medical personnel and:





Departure 1


Departure


Time of Disposition:  10:13


Impression:  


   Primary Impression:  


   Hypertensive emergency


   Additional Impression:  


   TIA (transient ischemic attack)


Disposition:   ADMITTED AS INPATIENT


Admit to:  Med Surg


Condition:  Guarded





Critical Care Note


Critical Care Time?:  Yes (90 min-critical care time only)





Stability


Stability form required:  No





Heart Score


Heart Score:  








Heart Score Response (Comments) Value


 


History Moderate Suspicious 1


 


EKG Normal 0


 


Age                                     45-64 1


 


Risk Factors                            1 or 2 risk factors 1


 


Troponin Normal limit 0


 


Total  3














I personally scribed for TERRY DUVAL MD ([email or web address]) on 3/4/25 at 10:10.  

Electronically submitted by Reno Beverly ([email or web address]).


I personally scribed for TERRY DUVAL MD ([email or web address]) on 3/4/25 at 11:28.  

Electronically submitted by Reno Beverly ([email or web address]).





TERRY DUVAL MD            Mar 4, 2025 10:10

## 2025-03-04 NOTE — DVH
CHEST RADIOGRAPH



Indication: SOB



Technique: Single frontal view of the chest was obtained



Comparison: XY CHEST PORTABLE on DOS: 8/20/23, XY CHEST PORTABLE on DOS: 6/23/23



FINDINGS: 



Lines and Tubes: None



Lungs: No focal consolidation.



Pleura: No effusion.



No pneumothorax. 



Cardiomediastinal contours: Unremarkable



Bones: No acute osseous abnormality.



IMPRESSION:



No acute cardiopulmonary disease.



Electronically Signed by: Miriam Caba at 03/04/2025 17:06:38 PM

## 2025-03-04 NOTE — DVHHP2
History of Present Illness


Reason for Visit:  Hypertension


History of Present Illness


Jennifer Schmitz is a 59-year-old female with past medical history of 

hypertension, hyperlipidemia, diabetes, and GERD who came in for hypertension. 

Patient has a history of hypertension so she monitors her blood pressure daily 

and for the last week it has been very high with associated weakness, dizziness,

headache, and light headiness. She states that her blood pressure gets elevated 

from time to time, and that she can usually manage it, but this time it wasn't  

getting better. Patient lives with her daughter, who is at the bedside. She 

states the patient has also been undergoing high amounts of stress due to her 

mother-in-law passing away recently, and trying to make all the arrangements. 

Patient had a previous TIA, so she is concerned about it happening again. 

Patient does not follow with cardiology, her primary care provider manages her 

blood pressure and mediations.


Cardiovascular:  HTN, hyperipidemia


CNS:  TIA


GI:  GERD


Endocrine:  Diabetes


Past Surgical History:   (x 2)


Smoke:  No


ALCOHOL:  none


Lives:  with Family





Review of Systems


Constitutional:  Yes: Other (Headache, dizziness); No: Fever, Chills, Sweats, 

Weakness, Malaise


Eyes:  No: Pain, Vision change, Conjunctivae inflammation, Eyelid inflammation, 

Other, Redness


ENT:  No: Ear pain, Ear discharge, Nose pain, Nose discharge, Nose congestion, 

Mouth pain, Mouth swelling, Throat pain, Throat swelling, Other


Respiratory:  No: Cough, Dry, Shortness of breath, SOB with excertion, Wheezing,

Hemoptysis, Pleuritic Pain, Sputum, Wheezing, Other


Cardiovascular:  Lt Headedness, Other (hypertension); No: Chest Pain, 

Palpitations, Orthopnea, Paroxysmal Noc. Dyspnea, Edema


Gastrointestinal:  No: Nausea, Vomiting, Abdominal Pain, Diarrhea, Constipation,

Melena, Hematochezia, Other


Genitourinary:  No Dysuria, No Frequency, No Incontinence, No Hematuria, No 

Retention, No Other


Musculoskeletal:  No: other, neck pain, shoulder pain, arm pain, back pain, hand

pain, leg pain, foot pain


Skin:  No: Rash, Lesions, Jaundice, Bruising, Other


Neurological:  Weakness; No: Numbness, Incoordination, Change in speech, 

Confusion, Seizures, Other


Allergies:  


Coded Allergies:  


     NO KNOWN ALLERGIES (Unverified , 16)


Medications





                               Current Medications








 Medications  Dose


 Ordered  Sig/Gertrude


 Route  Start Time


 Stop Time Status Last Admin


Dose Admin


 


 Sodium Chloride  10 ml  Q8HR


 IV  3/4/25 14:00


   UNV  





 


 Ondansetron HCl  4 mg  Q4HP  PRN


 IV  3/4/25 13:15


   UNV  





 


 Docusate Sodium  100 mg  BIDPRN  PRN


 PO  3/4/25 13:15


   UNV  





 


 Nitroglycerin  0.4 mg  Q5MINP  PRN


 SL  3/4/25 13:15


   UNV  





 


 Morphine Sulfate  2 mg  Q30M  PRN


 IV  3/4/25 13:15


   UNV  














Exam


Vital Signs





Vital Signs








  Date Time  Temp Pulse Resp B/P (MAP) Pulse Ox O2 Delivery O2 Flow Rate FiO2


 


3/4/25 12:03 97.9 68 18 145/81 (102) 95   





 97.9       


 


3/4/25 09:41      Room Air* 0 21








General Appearance:  Alert, Oriented X3, Cooperative, moderate distress


HEENT:  Atraumatic, PERRLA


Respiratory:  Clear to auscultation, Normal air movement


Cardiovascular:  Regular rate, Normal S1, Normal S2, No murmurs


Abdominal:  Normal bowel sounds, Soft, No tenderness, No hepatospenomegaly


Extremities:  No clubbing, No cyanosis, No edema, Normal pulses, No 

tenderness/swelling


Skin:  No rashes, No breakdown, No significant lesion


Neuro:  Normal gait, Normal speech, Strength at 5/5 X4 ext, Normal tone


Psych/Mental Status:  Mental status NL, Mood NL





Labs/Xrays





                                      Labs








Test


 3/4/25


10:29 3/4/25


09:53 Range/Units


 


 


Sodium Level 138   136-145  mmol/L


 


Potassium Level 4.3   3.5-5.1  mmol/L


 


Chloride Level 101     mmol/L


 


Carbon Dioxide Level 27   20-31  mmol/L


 


Anion Gap 10   5-15  


 


Blood Urea Nitrogen 11   9-23  mg/dL


 


Creatinine


 0.82 


 


 0.550-1.02


mg/dL


 


Glomerular Filtration Rate


Calc 83 


 


 >90  mL/min





 


BUN/Creatinine Ratio 13.4   10.0-20.0  


 


Serum Glucose 155 H    mg/dL


 


Calcium Level 10.7 H  8.7-10.4  mg/dL


 


Troponin I High Sensitivity 4   </=34  ng/L


 


White Blood Count


 


 6.7 


 4.4-10.8


10^3/uL


 


Red Blood Count


 


 4.54 


 4.0-5.20


10^6/uL


 


Hemoglobin  13.4  12.2-16.2  g/dL


 


Hematocrit  39.5  36.0-46.0  %


 


Mean Corpuscular Volume  87.0  80.0-100.0  fL


 


Mean Corpuscular Hemoglobin  29.6  28.0-32.0  pg


 


Mean Corpuscular Hemoglobin


Concent 


 34.0 


 32.0-36.0  g/dL





 


Red Cell Distribution Width  13.9  11.8-14.3  %


 


Platelet Count


 


 387 


 140-450


10^3/uL


 


Mean Platelet Volume  7.4  6.9-10.8  fL


 


Neutrophils (%) (Auto)  55.5  37.0-80.0  %


 


Lymphocytes (%) (Auto)  35.3  10.0-50.0  %


 


Monocytes (%) (Auto)  6.3  0.0-12.0  %


 


Eosinophils (%) (Auto)  2.1  0.0-7.0  %


 


Basophils (%) (Auto)  0.8  0.0-2.0  %


 


Neutrophils # (Auto)


 


 3.7 


 1.6-8.6  10


^3/uL


 


Lymphocytes # (Auto)


 


 2.4 


 0.4-5.4  10


^3/uL


 


Monocytes # (Auto)  0.4  0-1.3  10 ^3/uL


 


Eosinophils # (Auto)  0.1  0-0.8  10 ^3/uL


 


Basophils # (Auto)  0.1  0-0.2  10 ^3/uL


 


Nucleated Red Blood Cells  0.1   %


 


Urine Color  Light-yellow  Yellow  


 


Urine Clarity  Clear  Clear  


 


Urine pH  6.0  5.0-9.0  


 


Urine Specific Gravity  1.017  1.001-1.035  


 


Urine Protein  Negative  Negative  


 


Urine Ketones  Negative  Negative  


 


Urine Blood  Negative  Negative  /uL


 


Urine Nitrite  Negative  Negative  


 


Urine Bilirubin  Negative  Negative  


 


Urine Urobilinogen  Normal  Negative  mg/dL


 


Urine Leukocyte Esterase  2+  Negative  /uL


 


Urine RBC  1  0 - 4  /hpf


 


Urine Microscopic WBC  6 H 0-5  /HPF


 


Urine Squamous Epithelial


Cells 


 Few 


 <5  /hpf





 


Urine Bacteria  None seen  None Seen  /hpf


 


Urine Glucose  Normal  Normal  mg/dL





EXAM: CT HEAD WITHOUT CONTRAST





FINDINGS: 





There is no evidence of acute intracranial hemorrhage, extra-axial collection, 

mass effect, midline shift, herniation or hydrocephalus. 





The ventricles, sulci and cisterns are age appropriate.





The gray-white differentiation is intact. 





The visualized paranasal sinuses and mastoid air cells are clear. 





No depressed calvarial fracture. The surrounding soft tissues are unremarkable.





IMPRESSION: 





1. No evidence of acute intracranial abnormality.





Assessment/Plan


Assessment/Plan


Assessment: 


Hypertensive urgency, 


Hyperlipidemia, 


Diabetes, 





Plan: 


Admit to Tele, 


Cardiology consult, 


ECHO, 


Accu checks Q AC&HS with sliding scale, 


PRN antihypertensives, 


Home medications reconciled,


Plan discussed with:  Patient, Daughter


My Orders





                         Orders - ALEXANDRA SPRAGUE








Procedure Category Date Status





  Time 


 


Admit ADMIT 3/4/25 Transmitted





  13:09 


 


Code Status CODE 3/4/25 Transmitted





  13:09 


 


2 Gm Sodium Diet DIET 3/4/25 Transmitted





  Lunch 


 


Sodium Chloride Lock PHA 3/4/25 Logged





 (Saline Lock Ns)  14:00 


 


Ondansetron Hcl PHA 3/4/25 Logged





 (Zofran)  13:15 


 


Docusate Sodium PHA 3/4/25 Logged





Capsule (Colace  13:15 


 


Complete Blood Count LAB 3/5/25 Verified





  04:00 


 


Comprehensive LAB 3/5/25 Verified





Metabolic Panel  04:00 


 


Echo 2d Mode Cardiac US 3/4/25 Logged





DOP  13:09 


 


Condition: Critical LILIA 3/4/25 In Process





  13:09 


 


Nitroglycerin PHA 3/4/25 Logged





Sublingual (Ntrostat  13:15 


 


Morphine Sulfate PHA 3/4/25 Logged





Injection  13:15 


 


Stat Ekg For Chest LILIA 3/4/25 In Process





Pain  13:09 


 


Notify Md Of Changes LILIA 3/4/25 In Process





From Base  13:09 


 


Telemetry Monitor For LILIA 3/4/25 In Process





24 Hours  13:09 


 


Emergency Dysrhythmia LILIA 3/4/25 In Process





Protocol  13:09 


 


Rhythm Strips Once LILIA 3/4/25 In Process





Every Shift  13:09 


 


Oxygen By Nasal RT 3/4/25 Transmitted





Cannula  13:09 


 


* Cardiology Consult CONS 3/4/25 Transmitted





  13:09 


 


Aspirin Enteric PHA 3/5/25 Transmitted





Coated Tablet  10:00 


 


Atenolol Tablet PHA 3/5/25 Transmitted





 (Tenormin Tablet)  10:00 


 


Atorvastatin (Lipitor) PHA 3/5/25 Transmitted





  10:00 


 


Insulin Lantus PHA 3/4/25 Transmitted





 (Glargine) (Lantus)  22:00 


 


 (Nf) Omeprazole PHA 3/5/25 Transmitted





 (Omeprazole Dr)  10:00 


 


 (Nf) Losartan PHA 3/5/25 Transmitted





Potassium & Hydrochlo  10:00 











Date of Service:  Mar 4, 2025


Billing Provider:  ALEXANDRA SPRAGUE


Common Visit Codes:  99222-INITIAL INP/OBS CARE (MOD)











ALEXANDRA SPRAGUE           Mar 4, 2025 13:35

## 2025-03-05 VITALS
TEMPERATURE: 97.6 F | OXYGEN SATURATION: 98 % | DIASTOLIC BLOOD PRESSURE: 58 MMHG | RESPIRATION RATE: 20 BRPM | HEART RATE: 64 BPM | SYSTOLIC BLOOD PRESSURE: 110 MMHG

## 2025-03-05 VITALS — DIASTOLIC BLOOD PRESSURE: 70 MMHG | SYSTOLIC BLOOD PRESSURE: 126 MMHG | TEMPERATURE: 98.42 F

## 2025-03-05 VITALS
DIASTOLIC BLOOD PRESSURE: 70 MMHG | TEMPERATURE: 98.4 F | OXYGEN SATURATION: 92 % | HEART RATE: 78 BPM | SYSTOLIC BLOOD PRESSURE: 126 MMHG

## 2025-03-05 VITALS
HEART RATE: 68 BPM | RESPIRATION RATE: 18 BRPM | DIASTOLIC BLOOD PRESSURE: 77 MMHG | TEMPERATURE: 97.9 F | SYSTOLIC BLOOD PRESSURE: 133 MMHG | OXYGEN SATURATION: 96 %

## 2025-03-05 VITALS
TEMPERATURE: 98.2 F | HEART RATE: 65 BPM | DIASTOLIC BLOOD PRESSURE: 71 MMHG | SYSTOLIC BLOOD PRESSURE: 134 MMHG | OXYGEN SATURATION: 96 % | RESPIRATION RATE: 16 BRPM

## 2025-03-05 LAB
ALBUMIN SERPL-MCNC: 4.8 G/DL (ref 3.2–4.8)
ALP SERPL-CCNC: 126 U/L (ref 46–116)
ALT SERPL-CCNC: 21 U/L (ref 7–40)
ANION GAP SERPL CALCULATED.3IONS-SCNC: 11 MMOL/L (ref 5–15)
BILIRUB SERPL-MCNC: 0.4 MG/DL (ref 0.2–1)
BUN SERPL-MCNC: 14 MG/DL (ref 9–23)
BUN/CREAT SERPL: 14.9 (ref 10–20)
CALCIUM SERPL-MCNC: 10.7 MG/DL (ref 8.7–10.4)
CHLORIDE SERPL-SCNC: 100 MMOL/L (ref 98–107)
CO2 SERPL-SCNC: 28 MMOL/L (ref 20–31)
GLUCOSE SERPL-MCNC: 155 MG/DL (ref 74–106)
HCT VFR BLD AUTO: 37.5 % (ref 36–46)
HGB BLD-MCNC: 13 G/DL (ref 12.2–16.2)
MCH RBC QN AUTO: 29.9 PG (ref 28–32)
MCV RBC AUTO: 86.3 FL (ref 80–100)
NRBC BLD QL AUTO: 0.1 %
POTASSIUM SERPL-SCNC: 3.7 MMOL/L (ref 3.5–5.1)
PROT SERPL-MCNC: 7.5 G/DL (ref 5.7–8.2)
SODIUM SERPL-SCNC: 139 MMOL/L (ref 136–145)

## 2025-03-05 RX ADMIN — LOSARTAN POTASSIUM SCH MG: 50 TABLET, FILM COATED ORAL at 09:51

## 2025-03-05 RX ADMIN — ATORVASTATIN CALCIUM SCH MG: 20 TABLET, FILM COATED ORAL at 09:51

## 2025-03-05 RX ADMIN — INSULIN GLARGINE SCH UNITS: 100 INJECTION, SOLUTION SUBCUTANEOUS at 00:45

## 2025-03-05 RX ADMIN — ASPIRIN SCH MG: 81 TABLET ORAL at 09:50

## 2025-03-05 RX ADMIN — PANTOPRAZOLE SODIUM SCH MG: 40 TABLET, DELAYED RELEASE ORAL at 09:51

## 2025-03-05 NOTE — DVHSR
--------------- APPROVED REPORT --------------





EXAM: Two-dimensional and M-mode echocardiogram with Doppler and color Doppler.



Blood Pressure: 145/81 mmHg



INDICATION

Hypertensive urgency



RISK FACTORS

Height: 61, Weight: 150



DIMENSIONS 

LVDd3.9 (3.8-5.7cm)LA (2D)4.2 (1.9-4.0cm)Aortic Root3.5 (2.0-3.7cm)

LVDs2.8 (2.5-4.0cm)LA (MM) (1.9-4.0cm)Aortic Cusp Exc1.7 (1.5-2.0cm)

EF (%) 54.0 (55-70%)Rt. Atrium3.9 (1.9-4.0cm)Asc. Aorta cm



Mitral Valve

MitralMitral Stenosis

E wave0.72m/sMV Mean GR.2mmHg

A wave0.87m/sMV Peak GR.177mmHg

E/A ratio0.82D MVAcm2

DECEL [id number] 1/2 Hryg85ft

IVRTmsDop MVA3.70cm2



Aortic Valve

Aortic ValveAortic Stenosis

V23.63m/Catracho Peak GR.59mmHg

LVOT Diameter1.7 (1.8-2.4cm)Doppler AVAcm2



Pulmonic Valve

V21.00m/s



Tricuspid Valve

TR Velocity2.44m/s

[id number]



 Other Information 

Technically limited study due to  body habitus.



Conclusion

Sinus rhythm.  

Left atrial enlargement.  Concentric LVH.  

Valves are normal.

EF of 60% with normal RV function.

Mild MR.  

No pericardial effusion masses or vegetations.

## 2025-03-05 NOTE — DVHDS2
Discharge Summary


Date of Admission


Mar 4, 2025 at 13:09





Date of Discharge:


Mar 5, 2025





Labs/Diagnostic Data:





                               Laboratory Results








Test


 3/5/25


05:45 3/5/25


04:41 3/4/25


10:29 3/4/25


09:53


 


POC Glucose


 137 mg/dl


() 


 


 





 


White Blood Count


 


 6.7 10^3/uL


(4.4-10.8) 


 





 


Red Blood Count


 


 4.35 10^6/uL


(4.0-5.20) 


 





 


Hemoglobin


 


 13.0 g/dL


(12.2-16.2) 


 





 


Hematocrit


 


 37.5 %


(36.0-46.0) 


 





 


Mean Corpuscular Volume


 


 86.3 fL


(80.0-100.0) 


 





 


Mean Corpuscular Hemoglobin


 


 29.9 pg


(28.0-32.0) 


 





 


Mean Corpuscular Hemoglobin


Concent 


 34.6 g/dL


(32.0-36.0) 


 





 


Red Cell Distribution Width


 


 14.1 %


(11.8-14.3) 


 





 


Platelet Count


 


 385 10^3/uL


(140-450) 


 





 


Mean Platelet Volume


 


 7.6 fL


(6.9-10.8) 


 





 


Neutrophils (%) (Auto)


 


 38.4 %


(37.0-80.0) 


 





 


Lymphocytes (%) (Auto)


 


 52.1 %


(10.0-50.0) 


 





 


Monocytes (%) (Auto)


 


 6.7 %


(0.0-12.0) 


 





 


Eosinophils (%) (Auto)


 


 2.2 %


(0.0-7.0) 


 





 


Basophils (%) (Auto)


 


 0.6 %


(0.0-2.0) 


 





 


Neutrophils # (Auto)


 


 2.6 10 ^3/uL


(1.6-8.6) 


 





 


Lymphocytes # (Auto)


 


 3.5 10 ^3/uL


(0.4-5.4) 


 





 


Monocytes # (Auto)


 


 0.4 10 ^3/uL


(0-1.3) 


 





 


Eosinophils # (Auto)


 


 0.1 10 ^3/uL


(0-0.8) 


 





 


Basophils # (Auto)


 


 0 10 ^3/uL


(0-0.2) 


 





 


Nucleated Red Blood Cells  0.1 %   


 


Sodium Level


 


 139 mmol/L


(136-145) 


 





 


Potassium Level


 


 3.7 mmol/L


(3.5-5.1) 


 





 


Chloride Level


 


 100 mmol/L


() 


 





 


Carbon Dioxide Level


 


 28 mmol/L


(20-31) 


 





 


Anion Gap  11 (5-15)   


 


Blood Urea Nitrogen


 


 14 mg/dL


(9-23) 


 





 


Creatinine


 


 0.94 mg/dL


(0.550-1.02) 


 





 


Glomerular Filtration Rate


Calc 


 70 mL/min


(>90) 


 





 


BUN/Creatinine Ratio


 


 14.9


(10.0-20.0) 


 





 


Serum Glucose


 


 155 mg/dL


() 


 





 


Calcium Level


 


 10.7 mg/dL


(8.7-10.4) 


 





 


Total Bilirubin


 


 0.4 mg/dL


(0.2-1.0) 


 





 


Aspartate Amino Transferase


(AST) 


 20 U/L (13-40) 


 


 





 


Alanine Aminotransferase (ALT)  21 U/L (7-40)   


 


Alkaline Phosphatase


 


 126 U/L


() 


 





 


Total Protein


 


 7.5 g/dL


(5.7-8.2) 


 





 


Albumin


 


 4.8 g/dL


(3.2-4.8) 


 





 


Troponin I High Sensitivity   4 ng/L (</=34)  


 


Urine Color


 


 


 


 Light-yellow


(Yellow)


 


Urine Clarity    Clear (Clear) 


 


Urine pH    6.0 (5.0-9.0) 


 


Urine Specific Gravity


 


 


 


 1.017


(1.001-1.035)


 


Urine Protein


 


 


 


 Negative


(Negative)


 


Urine Ketones


 


 


 


 Negative


(Negative)


 


Urine Blood


 


 


 


 Negative /uL


(Negative)


 


Urine Nitrite


 


 


 


 Negative


(Negative)


 


Urine Bilirubin


 


 


 


 Negative


(Negative)


 


Urine Urobilinogen


 


 


 


 Normal mg/dL


(Negative)


 


Urine Leukocyte Esterase


 


 


 


 2+ /uL


(Negative)


 


Urine RBC    1 /hpf (0 - 4) 


 


Urine Microscopic WBC    6 /HPF (0-5) 


 


Urine Squamous Epithelial


Cells 


 


 


 Few /hpf (<5) 





 


Urine Bacteria


 


 


 


 None seen /hpf


(None Seen)


 


Urine Glucose


 


 


 


 Normal mg/dL


(Normal)


 


Hemoglobin A1c


 


 


 


 7.5 % A1C


(<5.7)


 


Magnesium Level


 


 


 


 1.7 mg/dL


(1.6-2.6)


 


B-Type Natriuretic Peptide


 


 


 


 58.44 pg/mL


(0-100)


 


Triglycerides Level


 


 


 


 192 mg/dL (<


150)


 


Cholesterol Level


 


 


 


 151 mg/dL (<


200)


 


LDL Cholesterol


 


 


 


 68 mg/dL (<


100)


 


HDL Cholesterol


 


 


 


 58 mg/dL


(40-59)


 


Thyroid Stimulating Hormone


(TSH) 


 


 


 3.32 uIU/mL


(0.55-4.78)





                             Other Laboratory Tests


3/5/25 04:41











Brief Hx & Hospital Course:


60 yo F admitted for hypertensive urgency. Patient compliant with meds, taking 

losartan HCTZ daily, compliant. Asymptomatic. Added amlodipine on regiment. seen

by cardio, echo wnl. telemetry reviewed. Patient to follow up with PCP, informed

regarding getting log prior to visit.





Condition at Discharge:


Good





Final Diagnosis/Problems List





hypertensive urgency


NIDDM


hyperlipidemia





Discharge Disposition:


Home





Discharge Instruct/Medications


Diet:  Cardiac 2g Na,low cholest


Activity:  No Restrictions, As Tolerated


Follow Up/Referral:  


pcp


Medications:  


losartan





amlodipine





hctz


39


Discharge Statement:


"Patient was advised to return to the ER or call 911 if any headaches, 

dizziness, shortness of breath, chest pain, abdominal pain, bleeding, fevers, or

worsening of medical condition.





Patient was counseled about treatment plan, medications, possible side effects, 

patientverbalized understanding. All questions were answered to the best of my 

ability.





This discharge took greater then 30 minutes in planning, reviewing 

documentation, counseling the patient, and discussing with other team members."





ASSESSMENT


hypertensive urgency





Date of Service:  Mar 5, 2025


Billing Provider:  TROY HARRINGTON MD


Common Visit Codes:  64120-IIF/OBS DISCH DAY >30min











TROY HARRINGTON MD                Mar 5, 2025 10:29

## 2025-03-05 NOTE — DVHPN2
Consult Progress Note


Date Seen:  Mar 5, 2025


Subjective


Review of Systems:  CVS:Normal, RESPIRATORY:Normal, NEURO:Normal





Objective


vital signs





                                   Vital Sign








  Date Time  Temp Pulse Resp B/P (MAP) Pulse Ox O2 Delivery O2 Flow Rate FiO2


 


3/5/25 11:12 36.9       


 


3/5/25 09:52    126/70    


 


3/5/25 08:00  78   92   


 


3/5/25 08:00      Room Air* 0 21


 


3/5/25 05:00   20     








medications





                               Current Medications








 Medications  Dose


 Ordered  Sig/Gertrude


 Route  Start Time


 Stop Time Status Last Admin


Dose Admin


 


 Aspirin  81 mg  DAILY


 PO  3/5/25 10:00


    3/5/25 09:50


81 MG


 


 Atorvastatin


 Calcium  20 mg  DAILY


 PO  3/5/25 10:00


    3/5/25 09:51


20 MG


 


 Insulin Glargine  25 units  HS


 SC  3/4/25 22:00


    3/5/25 00:45


25 UNITS


 


 Pantoprazole


 Sodium  20 mg  DAILY


 PO  3/5/25 10:00


    3/5/25 09:51


20 MG


 


 Diagnostic Test


  (Pha)  1 strip  ACHS


 VI  3/4/25 17:00


    3/5/25 06:02


1 STRIP


 


 Insulin Human


 Regular    HS


 SC  3/4/25 22:00


     





 


 Insulin Human


 Regular    AC


 SC  3/4/25 17:00


    3/5/25 06:04


2 UNITS


 


 Dextrose  50 ml  UD  PRN


 IV  3/4/25 13:30


     





 


 Losartan Potassium  100 mg  DAILY


 PO  3/5/25 10:00


    3/5/25 09:51


100 MG


 


 Hydrochlorothiazide  50 mg  DAILY


 PO  3/5/25 10:00


    3/5/25 09:52


50 MG








Examination:  LUNGS:Normal, CVS:Normal, NEURO:Normal


laboratory and microbiology


                                Laboratory Tests


3/5/25 04:41

















Test


 3/5/25


04:41 Range/Units


 


 


Serum Glucose 155 H   mg/dL











Problem List/Assessment/Plan


Problem List/Assessment/Plan


Hypertensive urgency


Dyslipidemia 


Insulin-dependent diabetes mellitus 


Nephrolithiasis


Obesity





Plan/Recommendation


(Dr. Cullen)





Transthoracic echocardiogram completed, pending report at this time.  Blood 

pressure better controlled with losartan therapy and HCTZ therapy.  CXR 

unremarkable.  Renal artery duplex with mild left-sided stenosis.  In the 

setting of an unremarkable echocardiogram there is no further cardiac workup 

indicated at this time.  Kindly call if in need to re-consult.  Thank you for 

allowing us to participate in this patient's care.  





This medical document was created using an electronic medical record system with

voice recognition software and computerized dictation system.  Although this 

document has been carefully reviewed, there might still be some phonetic and 

typographical errors.  Occasional wrong-word or ``sound-alike substitutions 

may have occurred due to the inherent limitations of voice recognition software.

 These areas are purely typographical due to imperfections of the software 

programs and do not reflect any compromise in the patient's medical care.  

Please read the chart carefully and recognize, using context, where these 

substitutions have occurred.


Plan discussed with:  Patient, Other





Date of Service:  Mar 5, 2025


Billing Provider:  LORI ALCALA


Cardiology Common Codes:  65445-[id number] INP/OBS CARE(Mod)











LORI ALCALA            Mar 5, 2025 12:09